# Patient Record
Sex: MALE | Race: WHITE | Employment: FULL TIME | URBAN - METROPOLITAN AREA
[De-identification: names, ages, dates, MRNs, and addresses within clinical notes are randomized per-mention and may not be internally consistent; named-entity substitution may affect disease eponyms.]

---

## 2017-03-09 PROBLEM — C44.42 SQUAMOUS CELL CARCINOMA OF SCALP: Status: ACTIVE | Noted: 2017-03-09

## 2017-07-27 PROBLEM — F41.1 ANXIETY IN ACUTE STRESS REACTION: Status: ACTIVE | Noted: 2017-07-27

## 2017-07-27 PROBLEM — F43.0 ANXIETY IN ACUTE STRESS REACTION: Status: ACTIVE | Noted: 2017-07-27

## 2017-07-27 PROBLEM — F51.01 PRIMARY INSOMNIA: Status: ACTIVE | Noted: 2017-07-27

## 2019-02-14 PROCEDURE — 86480 TB TEST CELL IMMUN MEASURE: CPT | Performed by: NURSE PRACTITIONER

## 2019-02-14 PROCEDURE — 80074 ACUTE HEPATITIS PANEL: CPT | Performed by: NURSE PRACTITIONER

## 2019-08-23 PROBLEM — S83.231D COMPLEX TEAR OF MEDIAL MENISCUS OF RIGHT KNEE AS CURRENT INJURY, SUBSEQUENT ENCOUNTER: Status: ACTIVE | Noted: 2019-08-23

## 2019-10-04 PROBLEM — Z47.89 ORTHOPEDIC AFTERCARE: Status: ACTIVE | Noted: 2019-10-04

## 2021-04-14 ENCOUNTER — HOSPITAL ENCOUNTER (EMERGENCY)
Facility: HOSPITAL | Age: 51
Discharge: HOME | End: 2021-04-14
Attending: EMERGENCY MEDICINE
Payer: COMMERCIAL

## 2021-04-14 VITALS
BODY MASS INDEX: 28.89 KG/M2 | WEIGHT: 218 LBS | TEMPERATURE: 98 F | HEART RATE: 59 BPM | HEIGHT: 73 IN | OXYGEN SATURATION: 97 % | RESPIRATION RATE: 17 BRPM | SYSTOLIC BLOOD PRESSURE: 141 MMHG | DIASTOLIC BLOOD PRESSURE: 93 MMHG

## 2021-04-14 DIAGNOSIS — G43.101 MIGRAINE WITH AURA AND WITH STATUS MIGRAINOSUS, NOT INTRACTABLE: Primary | ICD-10-CM

## 2021-04-14 PROCEDURE — 96374 THER/PROPH/DIAG INJ IV PUSH: CPT

## 2021-04-14 PROCEDURE — 96375 TX/PRO/DX INJ NEW DRUG ADDON: CPT

## 2021-04-14 PROCEDURE — 99284 EMERGENCY DEPT VISIT MOD MDM: CPT

## 2021-04-14 RX ORDER — METOCLOPRAMIDE HYDROCHLORIDE 5 MG/ML
10 INJECTION INTRAMUSCULAR; INTRAVENOUS ONCE
Status: COMPLETED | OUTPATIENT
Start: 2021-04-14 | End: 2021-04-14

## 2021-04-14 RX ORDER — DEXAMETHASONE SODIUM PHOSPHATE 10 MG/ML
10 INJECTION, SOLUTION INTRAMUSCULAR; INTRAVENOUS ONCE
Status: COMPLETED | OUTPATIENT
Start: 2021-04-14 | End: 2021-04-14

## 2021-04-14 RX ORDER — BUTALBITAL, ACETAMINOPHEN AND CAFFEINE 50; 325; 40 MG/1; MG/1; MG/1
1-2 TABLET ORAL EVERY 6 HOURS PRN
Qty: 10 TABLET | Refills: 0 | Status: SHIPPED | OUTPATIENT
Start: 2021-04-14 | End: 2021-04-16

## 2021-04-14 RX ORDER — DIPHENHYDRAMINE HYDROCHLORIDE 50 MG/ML
25 INJECTION INTRAMUSCULAR; INTRAVENOUS ONCE
Status: COMPLETED | OUTPATIENT
Start: 2021-04-14 | End: 2021-04-14

## 2021-04-14 RX ORDER — KETOROLAC TROMETHAMINE 30 MG/ML
15 INJECTION, SOLUTION INTRAMUSCULAR; INTRAVENOUS ONCE
Status: COMPLETED | OUTPATIENT
Start: 2021-04-14 | End: 2021-04-14

## 2021-04-14 NOTE — ED PROVIDER NOTES
Patient Seen in: [facility] Emergency Department      History   Patient presents with:  Headache    Stated Complaint: migraine x 2 weeks    HPI/Subjective:   HPI    Patient here for treatment evaluation of a migraine headache.   He has a history of m BMI 28.76 kg/m²         Physical Exam    Constitutional:  Appears well-developed and well-nourished. Head: Normocephalic and atraumatic. Nose: Nose normal.   Eyes: EOM are normal. Pupils are equal, round, and reactive to light.    Neck: Normal range o Impression:  Migraine with aura and with status migrainosus, not intractable  (primary encounter diagnosis)     Disposition:  Discharge  4/14/2021  1:20 pm    Follow-up:  Ivett Flowers, [address] [place] [id number]

## 2021-04-14 NOTE — ED INITIAL ASSESSMENT (HPI)
PT TO ED FROM HOME WITH C/O ACUTE MIGRAINE, RIGHT SIDED VISUAL DISTURBANCES, + NAUSEA, + LIGHT SENSITIVITY.  CURRENTLY ON ABX FOR SINUSITIS, CURRENT MIGRAINE MEDICATIONS NOT WORKING

## 2021-04-22 PROBLEM — G44.019 EPISODIC CLUSTER HEADACHE, NOT INTRACTABLE: Status: ACTIVE | Noted: 2021-04-22

## 2022-10-24 ENCOUNTER — ANESTHESIA (OUTPATIENT)
Dept: ENDOSCOPY | Facility: HOSPITAL | Age: 52
End: 2022-10-24
Payer: COMMERCIAL

## 2022-10-24 ENCOUNTER — ANESTHESIA EVENT (OUTPATIENT)
Dept: ENDOSCOPY | Facility: HOSPITAL | Age: 52
End: 2022-10-24
Payer: COMMERCIAL

## 2022-10-24 ENCOUNTER — HOSPITAL ENCOUNTER (OUTPATIENT)
Facility: HOSPITAL | Age: 52
Setting detail: HOSPITAL OUTPATIENT SURGERY
Discharge: HOME | End: 2022-10-24
Attending: INTERNAL MEDICINE | Admitting: INTERNAL MEDICINE
Payer: COMMERCIAL

## 2022-10-24 VITALS
RESPIRATION RATE: 20 BRPM | SYSTOLIC BLOOD PRESSURE: 137 MMHG | OXYGEN SATURATION: 100 % | HEART RATE: 59 BPM | HEIGHT: 73 IN | DIASTOLIC BLOOD PRESSURE: 88 MMHG | BODY MASS INDEX: 29.16 KG/M2 | WEIGHT: 220 LBS | TEMPERATURE: 98 F

## 2022-10-24 PROCEDURE — 88305 TISSUE EXAM BY PATHOLOGIST: CPT | Performed by: INTERNAL MEDICINE

## 2022-10-24 PROCEDURE — 0DBN8ZX EXCISION OF SIGMOID COLON, VIA NATURAL OR ARTIFICIAL OPENING ENDOSCOPIC, DIAGNOSTIC: ICD-10-PCS | Performed by: INTERNAL MEDICINE

## 2022-10-24 PROCEDURE — 0DBK8ZX EXCISION OF ASCENDING COLON, VIA NATURAL OR ARTIFICIAL OPENING ENDOSCOPIC, DIAGNOSTIC: ICD-10-PCS | Performed by: INTERNAL MEDICINE

## 2022-10-24 RX ORDER — ONDANSETRON 2 MG/ML
4 INJECTION INTRAMUSCULAR; INTRAVENOUS AS NEEDED
Status: DISCONTINUED | OUTPATIENT
Start: 2022-10-24 | End: 2022-10-24

## 2022-10-24 RX ORDER — SODIUM CHLORIDE, SODIUM LACTATE, POTASSIUM CHLORIDE, CALCIUM CHLORIDE 600; 310; 30; 20 MG/100ML; MG/100ML; MG/100ML; MG/100ML
INJECTION, SOLUTION INTRAVENOUS CONTINUOUS
Status: DISCONTINUED | OUTPATIENT
Start: 2022-10-24 | End: 2022-10-24

## 2022-10-24 RX ORDER — NALOXONE HYDROCHLORIDE 0.4 MG/ML
80 INJECTION, SOLUTION INTRAMUSCULAR; INTRAVENOUS; SUBCUTANEOUS AS NEEDED
Status: DISCONTINUED | OUTPATIENT
Start: 2022-10-24 | End: 2022-10-24

## 2022-10-24 RX ADMIN — SODIUM CHLORIDE, SODIUM LACTATE, POTASSIUM CHLORIDE, CALCIUM CHLORIDE: 600; 310; 30; 20 INJECTION, SOLUTION INTRAVENOUS at 14:26:00

## 2022-10-24 RX ADMIN — SODIUM CHLORIDE, SODIUM LACTATE, POTASSIUM CHLORIDE, CALCIUM CHLORIDE: 600; 310; 30; 20 INJECTION, SOLUTION INTRAVENOUS at 14:09:00

## 2022-10-24 NOTE — BRIEF OP NOTE
Pre-Operative Diagnosis: COLON SCREENING EXAM     Post-Operative Diagnosis: Polyps, diverticulosis       Procedure Performed:   COLONOSCOPY with cold snare polypectomy x4    Surgeon(s) and Role:     * Ryan Kyle MD - Primary    Assistant(s):        Surgical Findings: see op     Specimen: see op     Estimated Blood Loss: No data recorded    discharge instructions given to patient are including the following . .. 1) diverticulosis was noted (small pouches in the lining of the colon). I recommend a high fiber diet for this. 2) Colon polyps were noted and removed/biopsied during the procedure. I will contact you (possible by mail, "[email or web address] message, or possible phone call) with the pathology results. You may receive the results in [employer] before I have had a chance to review the results. 3) Laxative quality was inadequate to evaluate for colon cancers or polyp. I recommend repeat attempt at colonoscopy with 2 day laxative within 1 year.   Please call  to schedule the repeat colonoscopy             Chuy Pedraza MD  10/24/2022  2:38 PM
